# Patient Record
Sex: MALE | Race: BLACK OR AFRICAN AMERICAN | NOT HISPANIC OR LATINO
[De-identification: names, ages, dates, MRNs, and addresses within clinical notes are randomized per-mention and may not be internally consistent; named-entity substitution may affect disease eponyms.]

---

## 2024-11-21 ENCOUNTER — DASHBOARD ENCOUNTERS (OUTPATIENT)
Age: 59
End: 2024-11-21

## 2024-11-21 ENCOUNTER — OFFICE VISIT (OUTPATIENT)
Dept: URBAN - METROPOLITAN AREA CLINIC 109 | Facility: CLINIC | Age: 59
End: 2024-11-21
Payer: COMMERCIAL

## 2024-11-21 ENCOUNTER — LAB OUTSIDE AN ENCOUNTER (OUTPATIENT)
Dept: URBAN - METROPOLITAN AREA CLINIC 109 | Facility: CLINIC | Age: 59
End: 2024-11-21

## 2024-11-21 VITALS
HEART RATE: 65 BPM | DIASTOLIC BLOOD PRESSURE: 74 MMHG | SYSTOLIC BLOOD PRESSURE: 129 MMHG | TEMPERATURE: 98.4 F | WEIGHT: 200 LBS | HEIGHT: 70 IN | BODY MASS INDEX: 28.63 KG/M2

## 2024-11-21 DIAGNOSIS — R10.13 EPIGASTRIC ABDOMINAL PAIN: ICD-10-CM

## 2024-11-21 DIAGNOSIS — Z12.11 COLON CANCER SCREENING: ICD-10-CM

## 2024-11-21 PROCEDURE — 99203 OFFICE O/P NEW LOW 30 MIN: CPT | Performed by: INTERNAL MEDICINE

## 2024-11-21 NOTE — HPI-TODAY'S VISIT:
Patient is a 60 y/o M who presents w/ epigastric bloating and pain described as "gas build up in my chest/stomach and back." This has been ongoing for a couple of months. He is unsure if sxs are associated with eating. Denies heartburn, dysphagia, N/V, or other UGI sxs. He has been on medication rx'd by PCP for 3 days w/ relief of sxs. He is not sure what it's called. Denies NSAID use.  Sxs are not associated w/ BMs. Normal bowel habits- twice a day. No LGI sxs.  No change in bowel habits, appetite, weight loss. No abdominal pain or rectal bleeding. No known fhx of colon CA/polyps. No cardiac, lung or kidney problems. Not on blood thinners. No prior colon.

## 2025-01-21 ENCOUNTER — OFFICE VISIT (OUTPATIENT)
Dept: URBAN - METROPOLITAN AREA CLINIC 109 | Facility: CLINIC | Age: 60
End: 2025-01-21